# Patient Record
Sex: MALE | URBAN - METROPOLITAN AREA
[De-identification: names, ages, dates, MRNs, and addresses within clinical notes are randomized per-mention and may not be internally consistent; named-entity substitution may affect disease eponyms.]

---

## 2023-10-09 PROBLEM — I25.10 CAD, MULTIPLE VESSEL: Chronic | Status: ACTIVE | Noted: 2023-10-09

## 2023-10-09 PROBLEM — R03.0 ELEVATED BLOOD PRESSURE READING: Chronic | Status: ACTIVE | Noted: 2022-08-24

## 2023-10-09 PROBLEM — M19.032 PRIMARY OSTEOARTHRITIS OF LEFT WRIST: Status: ACTIVE | Noted: 2022-03-04

## 2023-10-09 PROBLEM — S81.809A NON-HEALING WOUND OF LOWER EXTREMITY: Status: ACTIVE | Noted: 2023-07-11

## 2023-10-09 PROBLEM — J30.9 ALLERGIC RHINITIS: Chronic | Status: ACTIVE | Noted: 2020-07-15

## 2023-10-09 PROBLEM — K57.30 DIVERTICULOSIS OF LARGE INTESTINE WITHOUT HEMORRHAGE: Status: ACTIVE | Noted: 2019-06-11

## 2023-10-09 PROBLEM — I10 ESSENTIAL HYPERTENSION: Status: ACTIVE | Noted: 2023-06-30

## 2023-10-09 PROBLEM — Z86.718 HISTORY OF DVT (DEEP VEIN THROMBOSIS): Chronic | Status: ACTIVE | Noted: 2018-09-01

## 2023-10-09 PROBLEM — E03.4 HYPOTHYROIDISM DUE TO ACQUIRED ATROPHY OF THYROID: Status: ACTIVE | Noted: 2018-11-02

## 2023-10-09 PROBLEM — R93.1 CARDIAC LV EJECTION FRACTION OF 35-39%: Status: ACTIVE | Noted: 2023-06-26

## 2023-10-09 PROBLEM — R94.30 CARDIAC LV EJECTION FRACTION OF 35-39%: Status: ACTIVE | Noted: 2023-06-26

## 2023-10-09 PROBLEM — N52.9 ERECTILE DYSFUNCTION: Chronic | Status: ACTIVE | Noted: 2023-10-09

## 2023-10-09 PROBLEM — M35.3 POLYMYALGIA RHEUMATICA (MULTI): Chronic | Status: ACTIVE | Noted: 2017-02-01

## 2023-10-09 PROBLEM — Z96.652 PRESENCE OF LEFT ARTIFICIAL KNEE JOINT: Status: ACTIVE | Noted: 2018-11-01

## 2023-10-09 PROBLEM — R55 NEUROLOGIC CARDIAC SYNCOPE: Status: ACTIVE | Noted: 2023-10-09

## 2023-10-09 PROBLEM — R94.30 CARDIAC LV EJECTION FRACTION >40%: Chronic | Status: ACTIVE | Noted: 2023-10-09

## 2023-10-09 PROBLEM — S88.111A: Status: ACTIVE | Noted: 2023-06-26

## 2023-10-09 PROBLEM — Z86.16 HISTORY OF 2019 NOVEL CORONAVIRUS DISEASE (COVID-19): Chronic | Status: ACTIVE | Noted: 2022-03-27

## 2023-10-09 PROBLEM — I73.9 PAD (PERIPHERAL ARTERY DISEASE) (CMS-HCC): Status: ACTIVE | Noted: 2023-06-06

## 2023-10-09 PROBLEM — M85.821: Chronic | Status: ACTIVE | Noted: 2017-12-02

## 2023-10-09 PROBLEM — I89.0 LYMPHEDEMA: Status: ACTIVE | Noted: 2023-05-08

## 2023-10-09 PROBLEM — I70.229 CRITICAL LOWER LIMB ISCHEMIA (MULTI): Status: ACTIVE | Noted: 2023-06-27

## 2023-10-09 PROBLEM — R42 VERTIGO: Status: ACTIVE | Noted: 2020-09-20

## 2023-10-09 PROBLEM — K64.9 HEMORRHOIDS WITHOUT COMPLICATION: Status: ACTIVE | Noted: 2019-06-11

## 2023-10-09 PROBLEM — R97.20 ELEVATED PROSTATE SPECIFIC ANTIGEN (PSA): Status: ACTIVE | Noted: 2022-07-18

## 2023-10-09 PROBLEM — F43.23 ADJUSTMENT DISORDER WITH MIXED ANXIETY AND DEPRESSED MOOD: Status: ACTIVE | Noted: 2023-07-01

## 2023-10-09 PROBLEM — M85.822: Chronic | Status: ACTIVE | Noted: 2017-12-02

## 2023-10-09 PROBLEM — F33.9 DEPRESSION, RECURRENT (CMS-HCC): Status: ACTIVE | Noted: 2022-11-21

## 2023-10-09 PROBLEM — M25.50 ARTHRALGIA OF MULTIPLE JOINTS: Chronic | Status: ACTIVE | Noted: 2017-01-26

## 2023-10-09 PROBLEM — F41.9 ANXIETY: Chronic | Status: ACTIVE | Noted: 2020-07-15

## 2023-10-09 PROBLEM — K21.9 ESOPHAGEAL REFLUX: Status: ACTIVE | Noted: 2017-09-07

## 2023-10-09 PROBLEM — M19.90 OSTEOARTHRITIS: Chronic | Status: ACTIVE | Noted: 2023-10-09

## 2023-10-09 PROBLEM — I65.23 CAROTID STENOSIS, ASYMPTOMATIC, BILATERAL: Chronic | Status: ACTIVE | Noted: 2020-07-23

## 2023-10-09 PROBLEM — I50.9 HEART FAILURE (MULTI): Chronic | Status: ACTIVE | Noted: 2022-11-21

## 2023-10-09 PROBLEM — I87.2 VENOUS INSUFFICIENCY (CHRONIC) (PERIPHERAL): Chronic | Status: ACTIVE | Noted: 2020-07-13

## 2023-10-09 RX ORDER — METHYLPREDNISOLONE 4 MG/1
6 TABLET ORAL DAILY
COMMUNITY
End: 2024-05-07 | Stop reason: WASHOUT

## 2023-10-09 RX ORDER — ASCORBIC ACID 500 MG
500 TABLET ORAL DAILY
COMMUNITY
End: 2024-02-13 | Stop reason: ALTCHOICE

## 2023-10-09 RX ORDER — SENNOSIDES 8.6 MG/1
TABLET ORAL
COMMUNITY
End: 2024-02-13 | Stop reason: ALTCHOICE

## 2023-10-09 RX ORDER — HYDROGEN PEROXIDE 3 %
20 SOLUTION, NON-ORAL MISCELLANEOUS
COMMUNITY

## 2023-10-09 RX ORDER — ALBUTEROL SULFATE 90 UG/1
2 AEROSOL, METERED RESPIRATORY (INHALATION) EVERY 6 HOURS PRN
COMMUNITY
Start: 2022-12-19

## 2023-10-09 RX ORDER — ASPIRIN 81 MG/1
81 TABLET ORAL DAILY
COMMUNITY
End: 2023-10-11 | Stop reason: ALTCHOICE

## 2023-10-09 RX ORDER — ROSUVASTATIN CALCIUM 10 MG/1
10 TABLET, COATED ORAL DAILY
COMMUNITY

## 2023-10-09 RX ORDER — CHOLECALCIFEROL (VITAMIN D3) 125 MCG
5000 CAPSULE ORAL DAILY
COMMUNITY
Start: 2023-05-22 | End: 2023-11-18

## 2023-10-09 RX ORDER — FERROUS SULFATE 325(65) MG
65 TABLET, DELAYED RELEASE (ENTERIC COATED) ORAL DAILY PRN
COMMUNITY
End: 2024-02-13 | Stop reason: ALTCHOICE

## 2023-10-09 RX ORDER — NITROGLYCERIN 0.4 MG/1
TABLET SUBLINGUAL
COMMUNITY
Start: 2022-11-04 | End: 2023-11-04

## 2023-10-09 RX ORDER — FLUTICASONE PROPIONATE 50 MCG
1 SPRAY, SUSPENSION (ML) NASAL DAILY
COMMUNITY
Start: 2022-11-09 | End: 2023-11-09

## 2023-10-09 RX ORDER — LEVOTHYROXINE SODIUM 75 UG/1
75 TABLET ORAL DAILY
COMMUNITY

## 2023-10-09 RX ORDER — POLYETHYLENE GLYCOL 3350 17 G/17G
17 POWDER, FOR SOLUTION ORAL DAILY PRN
COMMUNITY

## 2023-10-09 RX ORDER — TAMSULOSIN HYDROCHLORIDE 0.4 MG/1
0.4 CAPSULE ORAL DAILY
COMMUNITY

## 2023-10-09 RX ORDER — MULTIVITAMIN
1 TABLET ORAL DAILY
COMMUNITY
End: 2024-02-13 | Stop reason: ALTCHOICE

## 2023-10-09 RX ORDER — MUPIROCIN 20 MG/G
OINTMENT TOPICAL
COMMUNITY
Start: 2023-04-01 | End: 2024-02-16 | Stop reason: ALTCHOICE

## 2023-10-09 RX ORDER — ACETAMINOPHEN 325 MG/1
325 TABLET ORAL 2 TIMES DAILY PRN
COMMUNITY

## 2023-10-09 RX ORDER — CLOPIDOGREL BISULFATE 75 MG/1
1 TABLET ORAL DAILY
COMMUNITY
End: 2024-05-07 | Stop reason: ALTCHOICE

## 2023-10-09 RX ORDER — DORZOLAMIDE HYDROCHLORIDE AND TIMOLOL MALEATE 20; 5 MG/ML; MG/ML
1 SOLUTION/ DROPS OPHTHALMIC 2 TIMES DAILY
COMMUNITY
Start: 2021-06-21

## 2023-10-10 ENCOUNTER — TELEMEDICINE (OUTPATIENT)
Dept: CARDIOLOGY | Facility: CLINIC | Age: 78
End: 2023-10-10
Payer: COMMERCIAL

## 2023-10-10 DIAGNOSIS — R23.9 ALTERATION IN SKIN INTEGRITY RELATED TO SURGICAL INCISION: Primary | ICD-10-CM

## 2023-10-10 DIAGNOSIS — I70.222 CRITICAL LIMB ISCHEMIA OF LEFT LOWER EXTREMITY (MULTI): ICD-10-CM

## 2023-10-10 DIAGNOSIS — I73.9 PAD (PERIPHERAL ARTERY DISEASE) (CMS-HCC): ICD-10-CM

## 2023-10-10 DIAGNOSIS — Z89.432 S/P TRANSMETATARSAL AMPUTATION OF FOOT, LEFT (MULTI): ICD-10-CM

## 2023-10-10 PROCEDURE — 99213 OFFICE O/P EST LOW 20 MIN: CPT | Performed by: INTERNAL MEDICINE

## 2023-10-10 NOTE — PROGRESS NOTES
Medicaid Community Clinical Case Management Discharge Summary    Cedric Jones  Age: 77 y.o.  MRN: [id number]  Date: 10/10/2023  Location of service: {Location of service:61674}    Program Details  No programs to display    Care Plan  ***    Progress Update  ***    Follow-up Recommendations  ***    Patient Medications    Current Outpatient Medications:     acetaminophen (Tylenol) 325 mg tablet, Take 1 tablet (325 mg) by mouth 2 times a day as needed., Disp: , Rfl:     albuterol 90 mcg/actuation inhaler, Inhale 2 puffs every 6 hours if needed for shortness of breath., Disp: , Rfl:     apixaban (Eliquis) 5 mg tablet, TAKE 1 TABLET BY MOUTH EVERY 12 HOURS, Disp: 60 tablet, Rfl: 3    ascorbic acid (Vitamin C) 500 mg tablet, Take 1 tablet (500 mg) by mouth once daily., Disp: , Rfl:     aspirin 81 mg EC tablet, Take 1 tablet (81 mg) by mouth once daily., Disp: , Rfl:     cholecalciferol (Vitamin D-3) 125 MCG (5000 UT) capsule, Take 1 capsule (5,000 Units) by mouth once daily., Disp: , Rfl:     clopidogrel (Plavix) 75 mg tablet, , Disp: , Rfl:     dorzolamide-timoloL (Cosopt) 22.3-6.8 mg/mL ophthalmic solution, Administer into both eyes twice a day., Disp: , Rfl:     esomeprazole (NexIUM) 20 mg DR capsule, Take 1 capsule (20 mg) by mouth once daily in the morning. Take before meals., Disp: , Rfl:     ferrous sulfate 325 (65 Fe) MG EC tablet, Take 65 mg by mouth once daily as needed., Disp: , Rfl:     fluticasone (Flonase) 50 mcg/actuation nasal spray, Administer 1 spray into each nostril once daily., Disp: , Rfl:     levothyroxine (Synthroid, Levoxyl) 75 mcg tablet, Take 1 tablet (75 mcg) by mouth once daily., Disp: , Rfl:     methylPREDNISolone (Medrol) 4 mg tablet, Take 1.5 tablets (6 mg) by mouth once daily., Disp: , Rfl:     metoprolol succinate XL (Toprol-XL) 25 mg 24 hr tablet, TAKE 1/2 TABLET BY MOUTH ONCE DAILY, Disp: 15 tablet, Rfl: 3    multivitamin tablet, Take 1 tablet by mouth once daily., Disp: , Rfl:      mupirocin (Bactroban) 2 % ointment, Apply 1 treatment topically 3 (three) times daily., Disp: , Rfl:     nitroglycerin (Nitrostat) 0.4 mg SL tablet, Place 1 tablet (0.4 mg total) under the tongue daily as needed., Disp: , Rfl:     polyethylene glycol (Glycolax, Miralax) 17 gram/dose powder, Take 17 g by mouth once daily as needed., Disp: , Rfl:     rosuvastatin (Crestor) 10 mg tablet, Take 1 tablet (10 mg) by mouth once daily., Disp: , Rfl:     sennosides (senna) 8.6 mg tablet, Take as directed, Disp: , Rfl:     tamsulosin (Flomax) 0.4 mg 24 hr capsule, Take 1 capsule (0.4 mg) by mouth once daily., Disp: , Rfl:     traMADol (Ultram) 50 mg tablet, TAKE 1 TABLET BY MOUTH EVERY 6 HOURS AS NEEDED FOR MODERATE PAIN (4-6), Disp: 28 tablet, Rfl: 1    traZODone (Desyrel) 100 mg tablet, TAKE 1 TABLET BY MOUTH EVERY NIGHT AT BEDTIME, Disp: 30 tablet, Rfl: 3    vit A/vit C/vit E/zinc/copper (EYE MULTIVITAMIN ORAL), Eye-vites oral, Disp: , Rfl:       SHANTHI Espinosa-CNP

## 2023-10-11 PROBLEM — I70.222 CRITICAL LIMB ISCHEMIA OF LEFT LOWER EXTREMITY (MULTI): Status: ACTIVE | Noted: 2023-06-27

## 2023-10-12 NOTE — PROGRESS NOTES
The patient was contacted today via virtual visit with video and audio.     Chief complaint   1 month follow-up for left TMA incision site assessment.  Patient 3 months post peripheral angioplasty and 2 months post left TMA.     History of Present Illness  Cedric Jones is a 77 y.o. year old male from [place], [place] with h/o GERD, polymyalgia rheumatica on chronic steroids, CAD s/p PCI (last one in June 2022, unclear vessel), HTN, HLD, CHRIS, BPH, DVT, bilateral knee and hip replacements, ischemic cardiomyopathy(EF 15 -20% 7/2023), left ventricle thrombus and severe PAD, who developed bilateral ulcers in both foot after having biking adventure in [place]. 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 Pt was self referred to Dr. Duarte for left lower extremity limb salvage in July 2023. Pre procedure NICO/TBI from OSH left 0.31/0. On 7/17/23, status post PTA-DCB to left SFA-popliteal and PTA-stenting to left TPT/PT by Dr. Duarte for LLE CLTI 6 . Post-procedure 7/18 NICO/TBI 0.40/0, the waveform showed improved flow except in the digits. On 8/11/23, Pt had a TMA by his local podiatrist Dr. Enrique Nunez.   Patient being seen pt today with his wife Natalie crawley for left TMA site assessment 1 month post amputation.  Patient reports his podiatrist Dr. Nunez removed the sutures from the TMA site on 9/26/2023 and incision is well approximated with 1 small area remaining.  Patient reported Dr. Nunez is encouraged with the result the wound progression and will see him again on October 23, 2023 to get him fitted with shoe insert.  Patient now allowed limited weightbearing to pivot using a walker.  He also reports he has been compliant with leg elevation and now edema has resolved. Pt denies any leg or foot pain. Pt was instructed to stop taking ASA last virtual visit but continues to take it. Otherwise, complaint with all other  medications. Patient denies chest pain, shortness of breath, palpitation, fever or chills, dark stool or blood in the urine.     Past Medical History  Past Medical History:   Diagnosis Date    Atrial fibrillation (CMS/[facility])     CHF (congestive heart failure) (CMS/[facility])     Congenital heart disease     Critical limb ischemia of left lower extremity (CMS/[facility])     DVT (deep venous thrombosis) (CMS/[facility])     Hyperlipidemia     Hypertension     Ischemic dilated cardiomyopathy due to coronary artery disease     PAD (peripheral artery disease) (CMS/[facility])     Polymyalgia rheumatica (CMS/[facility])     on chronic steroids    Right atrial thrombus     Sleep apnea        Past Surgical History  Past Surgical History:   Procedure Laterality Date    CAROTID STENT      June 2022    CT ABDOMEN PELVIS ANGIOGRAM W AND/OR WO IV CONTRAST  09/19/2020    CT ABDOMEN PELVIS ANGIOGRAM W AND/OR WO IV CONTRAST 9/19/2020    FOOT AMPUTATION THROUGH METATARSAL Left 08/11/2023    By Dr. Enrique Nunez at [facility] in [place]    LEG AMPUTATION THROUGH LOWER TIBIA AND FIBULA Right 06/13/2023    TOTAL HIP ARTHROPLASTY Bilateral     TOTAL KNEE ARTHROPLASTY Bilateral        Social History  Social History     Tobacco Use    Smoking status: Never    Smokeless tobacco: Never   Substance Use Topics    Alcohol use: Yes     Alcohol/week: 2.0 standard drinks of alcohol     Types: 2 Glasses of wine per week    Drug use: Never       Family History     Family History   Family history unknown: Yes       Review of Systems  As per HPI, all other systems reviewed and negative.    Allergies:  Allergies   Allergen Reactions    Adhesive Unknown     Tears skin off    Aztreonam Unknown     Put on this after Vancomycin reaction; symptoms then got worse, but not sure what was making it worse    Cefepime Hives     Rash/hives; on at same time as Vancomycin      3/4/22 negative cefazolin test    Enoxaparin Hives    Heparin Other     Historical Data review with patient    Vancomycin  Analogues Hives and Swelling     Rash, hives        Outpatient Medications:  Current Outpatient Medications   Medication Instructions    acetaminophen (TYLENOL) 325 mg, oral, 2 times daily PRN    albuterol 90 mcg/actuation inhaler 2 puffs, inhalation, Every 6 hours PRN    apixaban (Eliquis) 5 mg tablet TAKE 1 TABLET BY MOUTH EVERY 12 HOURS    ascorbic acid (VITAMIN C) 500 mg, oral, Daily    cholecalciferol (VITAMIN D-3) 5,000 Units, oral, Daily    clopidogrel (Plavix) 75 mg tablet No dose, route, or frequency recorded.    dorzolamide-timoloL (Cosopt) 22.3-6.8 mg/mL ophthalmic solution Both Eyes, 2 times daily    esomeprazole (NEXIUM) 20 mg, oral, Daily before breakfast    ferrous sulfate 65 mg, oral, Daily PRN    fluticasone (Flonase) 50 mcg/actuation nasal spray 1 spray, Each Nostril, Daily    levothyroxine (SYNTHROID, LEVOXYL) 75 mcg, oral, Daily    methylPREDNISolone (MEDROL) 6 mg, oral, Daily    metoprolol succinate XL (Toprol-XL) 25 mg 24 hr tablet TAKE 1/2 TABLET BY MOUTH ONCE DAILY    multivitamin tablet 1 tablet, oral, Daily    mupirocin (Bactroban) 2 % ointment Apply 1 treatment topically 3 (three) times daily.    nitroglycerin (Nitrostat) 0.4 mg SL tablet Place 1 tablet (0.4 mg total) under the tongue daily as needed.    polyethylene glycol (GLYCOLAX, MIRALAX) 17 g, oral, Daily PRN    rosuvastatin (CRESTOR) 10 mg, oral, Daily    sennosides (senna) 8.6 mg tablet Take as directed    tamsulosin (FLOMAX) 0.4 mg, oral, Daily    traMADol (Ultram) 50 mg tablet TAKE 1 TABLET BY MOUTH EVERY 6 HOURS AS NEEDED FOR MODERATE PAIN (4-6)    traZODone (Desyrel) 100 mg tablet TAKE 1 TABLET BY MOUTH EVERY NIGHT AT BEDTIME    vit A/vit C/vit E/zinc/copper (EYE MULTIVITAMIN ORAL) Eye-vites oral         Vitals:  There were no vitals filed for this visit.    Physical Exam:  AOx3, NAD  Appropriate mood and behavior  Breathing unlabored, speaking in full sentences  Skin: No discolorations,  Extr: JOSÉ, Right BKA, left TMA, no  edema     Reviewed Study(s):    Cardiac Studies  Echo: 7/18/23  CONCLUSIONS:  1. Left ventricular systolic function is severely decreased with a 15-20% estimated ejection fraction.  2. Multiple segmental abnormalities exist. See findings.  3. Global hypokinesis is noted along with inferior, septal and apical akinesis. There is a moderate sized fixed filling defect within the LV apex measuring 1.6 cm x 1.4 cm consistent with LV apical thrombus.  4. Spectral Doppler shows a pseudonormal pattern of left ventricular diastolic filling.  5. There is severe eccentric left ventricular hypertrophy.  6. Left ventricular cavity size is severely dilated.  7. The aortic root ( 4.3 cm) and mid ascending aorta ( 4.3 cm) are moderately dilated.  8. Primary service notified of results at the time of reporting.  9. No prior echocardiogram available for comparison    Angiogram: 7/17/23 by Dr. Duarte and Dr. Chavis    1. LLE CLTI 5 status post PTA-DCB to left SFA-popliteal and PTA-stenting to left TPT/PT.  Vascular Studies: Postprocedure NICO 7/18/23  CONCLUSIONS:  Right Lower PVR: Triphasic flow is noted in the right common femoral artery. BKA noted.  Left Lower PVR: Evidence of moderate arterial occlusive disease in the left lower extremity at rest. Left pressures of >220 mmHg suggest no compressibility of vessels and may make absolute Segmental Limb Pressures (SLP) unreliable. Decreased digital perfusion noted. Biphasic flow is noted in the left common femoral artery, left posterior tibial artery and left dorsalis pedis artery. Part of limb flow mapping.  Imaging & Doppler Findings:  LEFT Lower PVR                Pressures Ratios  Left Posterior Tibial (Ankle) 255 mmHg  2.34  Left Dorsalis Pedis (Ankle)   44 mmHg   0.40  Left Digit (Great Toe)        0 mmHg    0.00        Assessment/Plan   A 77-year-old male from [place] with a history of CAD, hypertension, heart failure, LV thrombus and severe PAD complicated by gangrene now  status post right BKA 6/2023 and left TMA 8/2023.  On virtual video assessment left TMA site healing nicely, incision appears mostly well approximated, no redness or drainage, no discoloration or ischemic changes and no obvious edema noted.  Patient wife stated they are applying Betadine as instructed by his podiatrist and following up with Dr. Nunez.  Due to trauma    Plan  -No additional endovascular intervention is needed at this time as patient's wound continues to heal without complications  -Patient to continue with current medications Plavix, Eliquis and rosuvastatin.  Patient to stop taking aspirin.  - Continue aggressive risk factor modifications diet, exercise (walking as allowed) and foot protection (proper footwear with orthotic insert)  -Continue incision care as ordered and follow-up with podiatrist Dr. Nunez as scheduled  -3 months virtual or in person follow-up    Jeremy Duarte DO

## 2024-01-30 ENCOUNTER — APPOINTMENT (OUTPATIENT)
Dept: CARDIOLOGY | Facility: CLINIC | Age: 79
End: 2024-01-30
Payer: MEDICARE

## 2024-02-07 NOTE — PROGRESS NOTES
Virtual or Telephone Consent    An interactive audio and video telecommunication system which permits real time communications between the patient (at the originating site) and provider (at the distant site) was utilized to provide this telehealth service.   Verbal consent was requested and obtained from Cedric Jones on this date, 02/16/24 for a telehealth visit.      Chief complaint   3 month follow-up for left TMA incision site assessment.       History of Present Illness  Cedric Jones is a 78 y.o. year old male from [place], [place] with h/o GERD, polymyalgia rheumatica on chronic steroids, CAD s/p PCI (last one in June 2022, unclear vessel), HTN, HLD, CHRIS, BPH, DVT, bilateral knee and hip replacements, ischemic cardiomyopathy(EF 15 -20% 7/2023), left ventricle thrombus and severe PAD, who developed bilateral ulcers in both feet after having biking adventure in [place]. Course was complicated by non-healing wound on right leg, s/p BKA on 6/13/2023, he then developed necrotic ulcers in left toes. Pt was self referred to us for left lower extremity limb salvage in July 2023.   Pre procedure NICO/TBI from OSH Lt 0.31/0. On 7/17/23, pt underwent PTA-DCB of Lt SFA-popliteal and PTA-stenting to left TPT/PT by Dr. Duarte for LLE CLTI 6.   Post-procedure 7/18 NICO/TBI 0.40/0, the waveform showed improved flow except in the digits.   On 8/11/23, Pt had a TMA by his local podiatrist Dr. Enrique Nunez.     Patient is being seen today with his wife Natalie crawley for left TMA site assessment.    The wound is getting better, almost healed, except for a small site on the medial side of the incision, no drainage, no signs of infection (see pictures taken through the monitor, in  the media tab).  Pt and his podiatrist are pleased with the healing. Pt denies any foot pain.   He is complaint with his medications regimen, and foot protection. They are planning on 6 week vacation in [place], discussed the importance of  avoiding swimming in the ocean.    Past Medical History  Past Medical History:   Diagnosis Date    Atrial fibrillation (CMS/[facility])     CHF (congestive heart failure) (CMS/[facility])     Congenital heart disease     Critical limb ischemia of left lower extremity (CMS/[facility])     DVT (deep venous thrombosis) (CMS/[facility])     Hyperlipidemia     Hypertension     Ischemic dilated cardiomyopathy due to coronary artery disease     PAD (peripheral artery disease) (CMS/[facility])     Polymyalgia rheumatica (CMS/[facility])     on chronic steroids    Right atrial thrombus     S/P peripheral artery angioplasty with stent placement 07/17/2023    PTA-DCB to left SFA-popliteal and PTA-stenting to left TPT/PT    Sleep apnea        Past Surgical History  Past Surgical History:   Procedure Laterality Date    BALLOON ANGIOPLASTY, ARTERY Left     CAROTID STENT      June 2022    CT ABDOMEN PELVIS ANGIOGRAM W AND/OR WO IV CONTRAST  09/19/2020    CT ABDOMEN PELVIS ANGIOGRAM W AND/OR WO IV CONTRAST 9/19/2020    FOOT AMPUTATION THROUGH METATARSAL Left 08/11/2023    By Dr. Enrique Nunez at [facility] in [place]    LEG AMPUTATION THROUGH LOWER TIBIA AND FIBULA Right 06/13/2023    TOTAL HIP ARTHROPLASTY Bilateral     TOTAL KNEE ARTHROPLASTY Bilateral        Social History  Social History     Tobacco Use    Smoking status: Never    Smokeless tobacco: Never   Substance Use Topics    Alcohol use: Yes     Alcohol/week: 2.0 standard drinks of alcohol     Types: 2 Glasses of wine per week    Drug use: Never       Family History     Family History   Family history unknown: Yes       Review of Systems  As per HPI, all other systems reviewed and negative.    Allergies:  Allergies   Allergen Reactions    Adhesive Unknown     Tears skin off    Aztreonam Unknown     Put on this after Vancomycin reaction; symptoms then got worse, but not sure what was making it worse    Cefepime Hives     Rash/hives; on at same time as Vancomycin      3/4/22 negative cefazolin test    Enoxaparin  Hives    Heparin Other     Historical Data review with patient    Vancomycin Analogues Hives and Swelling     Rash, hives        Outpatient Medications:  Current Outpatient Medications   Medication Instructions    acetaminophen (TYLENOL) 325 mg, oral, 2 times daily PRN    albuterol 90 mcg/actuation inhaler 2 puffs, inhalation, Every 6 hours PRN    apixaban (Eliquis) 5 mg tablet TAKE 1 TABLET BY MOUTH EVERY 12 HOURS    brimonidine (AlphaGAN) 0.2 % ophthalmic solution 1 drop, 2 times daily, Left eye bid     clopidogrel (Plavix) 75 mg tablet No dose, route, or frequency recorded.    dorzolamide-timoloL (Cosopt) 22.3-6.8 mg/mL ophthalmic solution Both Eyes, 2 times daily    esomeprazole (NEXIUM) 20 mg, oral, Daily before breakfast    finasteride (PROSCAR) 5 mg, oral, Daily, Do not crush, chew, or split.    fluticasone (Flonase) 50 mcg/actuation nasal spray 1 spray, Each Nostril, Daily, Shake gently. Before first use, prime pump. After use, clean tip and replace cap.    levothyroxine (SYNTHROID, LEVOXYL) 75 mcg, oral, Daily    methylPREDNISolone (MEDROL) 6 mg, oral, Daily    metoprolol succinate XL (Toprol-XL) 25 mg 24 hr tablet TAKE 1/2 TABLET BY MOUTH ONCE DAILY    polyethylene glycol (GLYCOLAX, MIRALAX) 17 g, oral, Daily PRN    rosuvastatin (CRESTOR) 10 mg, oral, Daily    sacubitriL-valsartan (Entresto) 24-26 mg tablet 1 tablet, oral, 2 times daily, 1/2 tab bid    tamsulosin (FLOMAX) 0.4 mg, oral, Daily    vit A/vit C/vit E/zinc/copper (EYE MULTIVITAMIN ORAL) Eye-vites oral         Vitals:  No VS due to virtual visit    Physical Exam:  AOx3, NAD  Appropriate mood and behavior  Breathing unlabored, speaking in full sentences  Skin: No discolorations,  Extr: JOSÉ, Right BKA, left TMA, no edema, no open sores except for small residual noninfected dehiscence on the medial site of the incision      Reviewed Study(s):    Cardiac Studies  Echo: 7/18/23  CONCLUSIONS:  1. Left ventricular systolic function is severely decreased  with a 15-20% estimated ejection fraction.  2. Multiple segmental abnormalities exist. See findings.  3. Global hypokinesis is noted along with inferior, septal and apical akinesis. There is a moderate sized fixed filling defect within the LV apex measuring 1.6 cm x 1.4 cm consistent with LV apical thrombus.  4. Spectral Doppler shows a pseudonormal pattern of left ventricular diastolic filling.  5. There is severe eccentric left ventricular hypertrophy.  6. Left ventricular cavity size is severely dilated.  7. The aortic root ( 4.3 cm) and mid ascending aorta ( 4.3 cm) are moderately dilated.  8. Primary service notified of results at the time of reporting.  9. No prior echocardiogram available for comparison    Angiogram: 7/17/23 by Dr. Duarte and Dr. Chavis    1. LLE CLTI 5 status post PTA-DCB to left SFA-popliteal and PTA-stenting to left TPT/PT.  Vascular Studies: Postprocedure NICO 7/18/23  CONCLUSIONS:  Right Lower PVR: Triphasic flow is noted in the right common femoral artery. BKA noted.  Left Lower PVR: Evidence of moderate arterial occlusive disease in the left lower extremity at rest. Left pressures of >220 mmHg suggest no compressibility of vessels and may make absolute Segmental Limb Pressures (SLP) unreliable. Decreased digital perfusion noted. Biphasic flow is noted in the left common femoral artery, left posterior tibial artery and left dorsalis pedis artery. Part of limb flow mapping.  Imaging & Doppler Findings:  LEFT Lower PVR                Pressures Ratios  Left Posterior Tibial (Ankle) 255 mmHg  2.34  Left Dorsalis Pedis (Ankle)   44 mmHg   0.40  Left Digit (Great Toe)        0 mmHg    0.00        Assessment/Plan   Cedric Jones is a 78 y.o. year old male from [place], [place] with h/o GERD, polymyalgia rheumatica on chronic steroids, CAD s/p PCI (last one in June 2022, unclear vessel), HTN, HLD, CHRIS, BPH, DVT, bilateral knee and hip replacements, ischemic cardiomyopathy(EF 15 -20%  7/2023), left ventricle thrombus and severe PAD, who developed bilateral ulcers in both feet after having biking adventure in [place]. Course was complicated by non-healing wound on right leg, s/p BKA on 6/13/2023, he then developed necrotic ulcers in left toes. Pt was self referred to us for left lower extremity limb salvage in July 2023.   Pre procedure NICO/TBI from OSH Lt 0.31/0. On 7/17/23, pt underwent PTA-DCB of Lt SFA-popliteal and PTA-stenting to left TPT/PT by Dr. Duarte for LLE CLTI 6.   Post-procedure 7/18 NICO/TBI 0.40/0, the waveform showed improved flow except in the digits.   On 8/11/23, Pt had a TMA by his local podiatrist Dr. Enrique Nunez.   Patient is being seen today with his wife Natalie crawley for left TMA site assessment.    The wound is getting better, almost healed, except for a small site on the medial side of the incision, no drainage, no signs of infection (see pictures taken through the monitor, in  the media tab).  Pt and his podiatrist are pleased with the healing. Pt denies any foot pain.   He is complaint with his medications regimen, and foot protection. They are planning on 6 week vacation in [place], discussed the importance of avoiding swimming in the ocean.  Patient to continue with current medications Plavix, Eliquis and rosuvastatin.    Continue with exercise (walking as allowed) and foot protection (proper footwear with orthotic insert)  Follow up in May in person, when back from [place], with NICO/TBI and arterial duplex US, or sooner if needed    Jeremy Duarte,

## 2024-02-13 ENCOUNTER — TELEMEDICINE (OUTPATIENT)
Dept: CARDIOLOGY | Facility: CLINIC | Age: 79
End: 2024-02-13
Payer: COMMERCIAL

## 2024-02-13 DIAGNOSIS — I73.9 PAD (PERIPHERAL ARTERY DISEASE) (CMS-HCC): Primary | ICD-10-CM

## 2024-02-13 DIAGNOSIS — S88.111A: ICD-10-CM

## 2024-02-13 DIAGNOSIS — Z95.820 S/P PERIPHERAL ARTERY ANGIOPLASTY WITH STENT PLACEMENT: ICD-10-CM

## 2024-02-13 DIAGNOSIS — Z79.01 ANTICOAGULANT LONG-TERM USE: ICD-10-CM

## 2024-02-13 DIAGNOSIS — Z89.432 S/P TRANSMETATARSAL AMPUTATION OF FOOT, LEFT (MULTI): ICD-10-CM

## 2024-02-13 PROCEDURE — 1159F MED LIST DOCD IN RCRD: CPT | Performed by: INTERNAL MEDICINE

## 2024-02-13 PROCEDURE — 1036F TOBACCO NON-USER: CPT | Performed by: INTERNAL MEDICINE

## 2024-02-13 PROCEDURE — 99214 OFFICE O/P EST MOD 30 MIN: CPT | Performed by: INTERNAL MEDICINE

## 2024-02-13 RX ORDER — SACUBITRIL AND VALSARTAN 24; 26 MG/1; MG/1
0.5 TABLET, FILM COATED ORAL 2 TIMES DAILY
COMMUNITY

## 2024-02-13 RX ORDER — FLUTICASONE PROPIONATE 50 MCG
1 SPRAY, SUSPENSION (ML) NASAL DAILY
COMMUNITY

## 2024-02-13 RX ORDER — FINASTERIDE 5 MG/1
5 TABLET, FILM COATED ORAL DAILY
COMMUNITY

## 2024-02-13 RX ORDER — BRIMONIDINE TARTRATE 2 MG/ML
1 SOLUTION/ DROPS OPHTHALMIC 2 TIMES DAILY
COMMUNITY

## 2024-05-03 NOTE — PROGRESS NOTES
Chief complaint   PAD Follow-up      History of Present Illness  Cedric Jones is a 78 y.o. year old male from [place], [place] with h/o GERD, polymyalgia rheumatica on chronic steroids, CAD s/p PCI (last one in June 2022, unclear vessel), HTN, HLD, CHRIS, BPH, DVT, bilateral knee and hip replacements, ischemic cardiomyopathy(EF 15 -20% 7/2023), left ventricle thrombus and severe PAD, who developed bilateral ulcers in both feet after having biking adventure in [place]. 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 Pt was self referred to us for left lower extremity limb salvage in July 2023.   On 7/17/23, pt underwent PTA-DCB of Lt SFA-popliteal and PTA-stenting to left TPT/PT by Dr. Duarte for LLE CLTI 6.   Post-procedure 7/18 NICO/TBI 0.40/0, the waveform showed improved flow except in the digits.   On 8/11/23, Pt had a Lt TMA by his local podiatrist Dr. Enrique Nunez.   Pt was last seen virtually 2/2024. At that time TMA site has almost healed and pt and podiatrist were pleased with the results.   Today he states his TMA is completely healed and denies LE pain at rest or with activity. Denies new Lt foot wounds and was recently discharged from wound care given healed TMA site. Continues to takes plavix and eliquis and denies easy bleeding or bruising. However, he was told by his optometrist that he was having some bleeding in his eye and was recommended to follow up on duration of anticoagulation.     Past Medical History  Past Medical History:   Diagnosis Date    Atrial fibrillation (Multi)     CHF (congestive heart failure) (Multi)     Congenital heart disease ([facility])     Critical limb ischemia of left lower extremity (Multi)     DVT (deep venous thrombosis) (Multi)     Hyperlipidemia     Hypertension     Ischemic dilated cardiomyopathy due to coronary artery disease     PAD (peripheral artery disease) (CMS-HCC)      Polymyalgia rheumatica (Multi)     on chronic steroids    Right atrial thrombus     S/P peripheral artery angioplasty with stent placement 07/17/2023    PTA-DCB to left SFA-popliteal and PTA-stenting to left TPT/PT    Sleep apnea        Past Surgical History  Past Surgical History:   Procedure Laterality Date    BALLOON ANGIOPLASTY, ARTERY Left     CAROTID STENT      June 2022    CT ABDOMEN PELVIS ANGIOGRAM W AND/OR WO IV CONTRAST  09/19/2020    CT ABDOMEN PELVIS ANGIOGRAM W AND/OR WO IV CONTRAST 9/19/2020    FOOT AMPUTATION THROUGH METATARSAL Left 08/11/2023    By Dr. Enrique Nunez at [facility] in [place]    LEG AMPUTATION THROUGH LOWER TIBIA AND FIBULA Right 06/13/2023    TOTAL HIP ARTHROPLASTY Bilateral     TOTAL KNEE ARTHROPLASTY Bilateral        Social History  Social History     Tobacco Use    Smoking status: Never    Smokeless tobacco: Never   Substance Use Topics    Alcohol use: Yes     Alcohol/week: 2.0 standard drinks of alcohol     Types: 2 Glasses of wine per week    Drug use: Never       Family History     Family History   Family history unknown: Yes       Review of Systems  As per HPI, all other systems reviewed and negative.    Allergies:  Allergies   Allergen Reactions    Adhesive Unknown     Tears skin off    Aztreonam Unknown     Put on this after Vancomycin reaction; symptoms then got worse, but not sure what was making it worse    Cefepime Hives     Rash/hives; on at same time as Vancomycin      3/4/22 negative cefazolin test    Enoxaparin Hives    Heparin Other     Historical Data review with patient    Vancomycin Analogues Hives and Swelling     Rash, hives        Outpatient Medications:  Current Outpatient Medications   Medication Instructions    acetaminophen (TYLENOL) 325 mg, oral, 2 times daily PRN    albuterol 90 mcg/actuation inhaler 2 puffs, inhalation, Every 6 hours PRN    apixaban (Eliquis) 5 mg tablet TAKE 1 TABLET BY MOUTH EVERY 12 HOURS    brimonidine (AlphaGAN) 0.2 % ophthalmic  "solution 1 drop, 2 times daily, Left eye bid     clopidogrel (Plavix) 75 mg tablet No dose, route, or frequency recorded.    dorzolamide-timoloL (Cosopt) 22.3-6.8 mg/mL ophthalmic solution Both Eyes, 2 times daily    esomeprazole (NEXIUM) 20 mg, oral, Daily before breakfast    finasteride (PROSCAR) 5 mg, oral, Daily, Do not crush, chew, or split.    fluticasone (Flonase) 50 mcg/actuation nasal spray 1 spray, Each Nostril, Daily, Shake gently. Before first use, prime pump. After use, clean tip and replace cap.    levothyroxine (SYNTHROID, LEVOXYL) 75 mcg, oral, Daily    methylPREDNISolone (MEDROL) 6 mg, oral, Daily    metoprolol succinate XL (Toprol-XL) 25 mg 24 hr tablet TAKE 1/2 TABLET BY MOUTH ONCE DAILY    polyethylene glycol (GLYCOLAX, MIRALAX) 17 g, oral, Daily PRN    rosuvastatin (CRESTOR) 10 mg, oral, Daily    sacubitriL-valsartan (Entresto) 24-26 mg tablet 1 tablet, oral, 2 times daily, 1/2 tab bid    tamsulosin (FLOMAX) 0.4 mg, oral, Daily    vit A/vit C/vit E/zinc/copper (EYE MULTIVITAMIN ORAL) Eye-vites oral         Vitals:      5/7/2024     2:38 PM   Vitals   Systolic 123   Diastolic 60   Heart Rate 77   Height (in) 1.854 m (6' 1\")   Weight (lb) 179   BMI 23.62 kg/m2   BSA (m2) 2.05 m2   Visit Report Report        Physical Exam:  General: NAD, appears comfortable, Aox3  Heart: RRR, no m/r/g  Lungs: CTAB, no rales/ronchi/wheezing   Extr: Right BKA, left TMA, no edema, no open sores/ulcers, warm, +distal pulses      Reviewed Study(s):  PRELIMINARY CONCLUSIONS:  Left Lower Arterial:  Left Lower Arterial:  Left LimFlow Table:  Inflow artery VF 67.1 ml/min Diam 5.12 mm 63.0/3.7 cm/s  Crossing stent/anast VF 55 ml/min Diam 5.16 mm 54.3/3.7 cm/s  Mid stent -VF 42 ml/min Diam 2.98 mm 77/9 cm/s  Distal edge of stent graft VF 15.5 ml/min Diam 2.26 mm 73/7 cm/s  Outflow arterialized vein VF 9.7 ml/min Diam 1.42 mm 34/7 cm/s  Outflow vein 2cm below VF 10 ml/min Diam .142 mm 34/7 cm/s  LPV prox foot VF 26 ml/min Diam " 2.80 mm 18/6 cm/s.           Imaging & Doppler Findings:     Right                     Left   PSV                      PSV               CFA        56 cm/s        Profunda Proximal 44 cm/s          SFA Proximal    44 cm/s             SFA Mid      105 cm/s           SFA Distal     99 cm/s            Popliteal     54 cm/s           RASHEEDA Distal     20 cm/s         Peroneal Distal   9 cm/s           PTA Distal     58 cm/s    Cardiac Studies  Echo: 7/18/23  CONCLUSIONS:  1. Left ventricular systolic function is severely decreased with a 15-20% estimated ejection fraction.  2. Multiple segmental abnormalities exist. See findings.  3. Global hypokinesis is noted along with inferior, septal and apical akinesis. There is a moderate sized fixed filling defect within the LV apex measuring 1.6 cm x 1.4 cm consistent with LV apical thrombus.  4. Spectral Doppler shows a pseudonormal pattern of left ventricular diastolic filling.  5. There is severe eccentric left ventricular hypertrophy.  6. Left ventricular cavity size is severely dilated.  7. The aortic root ( 4.3 cm) and mid ascending aorta ( 4.3 cm) are moderately dilated.  8. Primary service notified of results at the time of reporting.  9. No prior echocardiogram available for comparison    Angiogram: 7/17/23 by Dr. Duarte and Dr. Chavis    1. LLE CLTI 5 status post PTA-DCB to left SFA-popliteal and PTA-stenting to left TPT/PT.  Vascular Studies: Postprocedure NICO 7/18/23  CONCLUSIONS:  Right Lower PVR: Triphasic flow is noted in the right common femoral artery. BKA noted.  Left Lower PVR: Evidence of moderate arterial occlusive disease in the left lower extremity at rest. Left pressures of >220 mmHg suggest no compressibility of vessels and may make absolute Segmental Limb Pressures (SLP) unreliable. Decreased digital perfusion noted. Biphasic flow is noted in the left common femoral artery, left posterior tibial artery and left dorsalis pedis artery. Part of limb flow  mapping.  Imaging & Doppler Findings:  LEFT Lower PVR                Pressures Ratios  Left Posterior Tibial (Ankle) 255 mmHg  2.34  Left Dorsalis Pedis (Ankle)   44 mmHg   0.40  Left Digit (Great Toe)        0 mmHg    0.00     Vascular Duplex 5/7/24:  PRELIMINARY CONCLUSIONS:  Left Lower Arterial: LimFlow Post Op  Left Lower Arterial:  Left LimFlow Table:  Inflow artery VF 67.1 ml/min Diam 5.12 mm 63.0/3.7 cm/s  Crossing stent/anast VF 55 ml/min Diam 5.16 mm 54.3/3.7 cm/s  Mid stent -VF 42 ml/min Diam 2.98 mm 77/9 cm/s  Distal edge of stent graft VF 15.5 ml/min Diam 2.26 mm 73/7 cm/s  Outflow arterialized vein VF 9.7 ml/min Diam 1.42 mm 34/7 cm/s  Outflow vein 2cm below VF 10 ml/min Diam .142 mm 34/7 cm/s  LPV prox foot VF 26 ml/min Diam 2.80 mm 18/6 cm/s.           Imaging & Doppler Findings:     Right                     Left   PSV                      PSV               CFA        56 cm/s        Profunda Proximal 44 cm/s          SFA Proximal    44 cm/s             SFA Mid      105 cm/s           SFA Distal     99 cm/s            Popliteal     54 cm/s           RASHEEDA Distal     20 cm/s         Peroneal Distal   9 cm/s           PTA Distal     58 cm/s      Assessment/Plan   Cedric Jones is a 78 y.o. year old male from [place], [place] with h/o GERD, polymyalgia rheumatica on chronic steroids, CAD s/p PCI (last one in June 2022, unclear vessel), HTN, HLD, CHRIS, BPH, DVT, bilateral knee and hip replacements, ischemic cardiomyopathy(EF 15 -20% 7/2023), left ventricle thrombus and severe PAD, who developed bilateral ulcers in both feet after having biking adventure in [place]. 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 Pt was self referred to us for left lower extremity limb salvage in July 2023.   On 7/17/23, pt underwent PTA-DCB of Lt SFA-popliteal and PTA-stenting to left TPT/PT by Dr. Duarte for LLE CLTI 6.   Post-procedure  7/18 NICO/TBI 0.40/0, the waveform showed improved flow except in the digits.   On 8/11/23, Pt had a Lt TMA by his local podiatrist Dr. Enrique Nunez.   Pt was last seen virtually 2/2024. At that time TMA site has almost healed and pt and podiatrist were pleased with the results.   Today he states his TMA is completely healed and denies LE pain at rest or with activity. Denies new Lt foot wounds and was recently discharged from wound care given healed TMA site. Continues to takes plavix and eliquis and denies easy bleeding or bruising. However, he was told by his optometrist that he was having some bleeding in his eye and was recommended to follow up on duration of anticoagulation.     Plan:   -Stop plavix (given c/f bleeding in the eye by optometrist)  -Start ASA 81 daily  -Continue statin   -Follow up in 1 year with repeat LLE vascular US with NICO     Jeremy Duarte DO

## 2024-05-07 ENCOUNTER — APPOINTMENT (OUTPATIENT)
Dept: RADIOLOGY | Facility: HOSPITAL | Age: 79
End: 2024-05-07
Payer: COMMERCIAL

## 2024-05-07 ENCOUNTER — HOSPITAL ENCOUNTER (OUTPATIENT)
Dept: VASCULAR MEDICINE | Facility: HOSPITAL | Age: 79
Discharge: HOME | End: 2024-05-07
Payer: COMMERCIAL

## 2024-05-07 ENCOUNTER — APPOINTMENT (OUTPATIENT)
Dept: VASCULAR MEDICINE | Facility: HOSPITAL | Age: 79
End: 2024-05-07
Payer: COMMERCIAL

## 2024-05-07 ENCOUNTER — OFFICE VISIT (OUTPATIENT)
Dept: CARDIOLOGY | Facility: CLINIC | Age: 79
End: 2024-05-07
Payer: MEDICARE

## 2024-05-07 VITALS
BODY MASS INDEX: 23.72 KG/M2 | HEIGHT: 73 IN | WEIGHT: 179 LBS | HEART RATE: 77 BPM | DIASTOLIC BLOOD PRESSURE: 60 MMHG | SYSTOLIC BLOOD PRESSURE: 123 MMHG

## 2024-05-07 DIAGNOSIS — I73.9 PAD (PERIPHERAL ARTERY DISEASE) (CMS-HCC): ICD-10-CM

## 2024-05-07 DIAGNOSIS — I15.0 RENOVASCULAR HYPERTENSION: ICD-10-CM

## 2024-05-07 DIAGNOSIS — I73.9 PAD (PERIPHERAL ARTERY DISEASE) (CMS-HCC): Primary | ICD-10-CM

## 2024-05-07 PROBLEM — R94.30 CARDIAC LV EJECTION FRACTION 21-30%: Status: ACTIVE | Noted: 2023-06-26

## 2024-05-07 PROBLEM — R31.0 GROSS HEMATURIA: Status: ACTIVE | Noted: 2023-10-19

## 2024-05-07 PROBLEM — N20.1 RIGHT DISTAL URETERAL CALCULUS: Status: ACTIVE | Noted: 2023-10-19

## 2024-05-07 PROBLEM — I50.40: Chronic | Status: ACTIVE | Noted: 2023-10-30

## 2024-05-07 PROBLEM — Z89.432: Status: ACTIVE | Noted: 2023-08-30

## 2024-05-07 PROBLEM — M06.9 RHEUMATOID ARTHRITIS (MULTI): Status: ACTIVE | Noted: 2024-05-07

## 2024-05-07 PROBLEM — Z86.73 PERSONAL HISTORY OF TRANSIENT ISCHEMIC ATTACK (TIA), AND CEREBRAL INFARCTION WITHOUT RESIDUAL DEFICITS: Status: ACTIVE | Noted: 2018-07-11

## 2024-05-07 PROBLEM — M62.81 MUSCLE WEAKNESS (GENERALIZED): Status: ACTIVE | Noted: 2018-07-11

## 2024-05-07 PROBLEM — R93.89 ABNORMAL CHEST CT: Status: ACTIVE | Noted: 2023-12-19

## 2024-05-07 PROBLEM — I51.3 LV (LEFT VENTRICULAR) MURAL THROMBUS: Status: ACTIVE | Noted: 2022-07-06

## 2024-05-07 PROBLEM — L03.90 CELLULITIS: Status: ACTIVE | Noted: 2023-12-16

## 2024-05-07 PROBLEM — T84.54XD INFECTION AND INFLAMMATORY REACTION DUE TO INTERNAL LEFT KNEE PROSTHESIS, SUBSEQUENT ENCOUNTER: Status: ACTIVE | Noted: 2018-07-11

## 2024-05-07 PROBLEM — R93.1 CARDIAC LV EJECTION FRACTION 21-30%: Status: ACTIVE | Noted: 2023-06-26

## 2024-05-07 PROBLEM — L97.522: Status: ACTIVE | Noted: 2023-09-26

## 2024-05-07 PROBLEM — R06.3 CHEYNE-STOKES RESPIRATION: Status: ACTIVE | Noted: 2024-02-24

## 2024-05-07 PROBLEM — R26.2 DIFFICULTY IN WALKING, NOT ELSEWHERE CLASSIFIED: Status: ACTIVE | Noted: 2018-07-11

## 2024-05-07 PROBLEM — I25.5 ISCHEMIC CARDIOMYOPATHY: Chronic | Status: ACTIVE | Noted: 2023-11-14

## 2024-05-07 PROBLEM — E78.5 HYPERLIPIDEMIA, UNSPECIFIED: Status: ACTIVE | Noted: 2018-07-11

## 2024-05-07 PROCEDURE — 99214 OFFICE O/P EST MOD 30 MIN: CPT | Performed by: INTERNAL MEDICINE

## 2024-05-07 PROCEDURE — 1159F MED LIST DOCD IN RCRD: CPT | Performed by: INTERNAL MEDICINE

## 2024-05-07 PROCEDURE — 3074F SYST BP LT 130 MM HG: CPT | Performed by: INTERNAL MEDICINE

## 2024-05-07 PROCEDURE — 93926 LOWER EXTREMITY STUDY: CPT | Performed by: SURGERY

## 2024-05-07 PROCEDURE — 1126F AMNT PAIN NOTED NONE PRSNT: CPT | Performed by: INTERNAL MEDICINE

## 2024-05-07 PROCEDURE — 93926 LOWER EXTREMITY STUDY: CPT

## 2024-05-07 PROCEDURE — 3078F DIAST BP <80 MM HG: CPT | Performed by: INTERNAL MEDICINE

## 2024-05-07 RX ORDER — IPRATROPIUM BROMIDE 42 UG/1
2 SPRAY, METERED NASAL 4 TIMES DAILY PRN
COMMUNITY
Start: 2024-04-17

## 2024-05-07 RX ORDER — FERROUS SULFATE 325(65) MG
1 TABLET ORAL
COMMUNITY
Start: 2023-11-15

## 2024-05-07 RX ORDER — ATORVASTATIN CALCIUM 20 MG/1
20 TABLET, FILM COATED ORAL DAILY
COMMUNITY
Start: 2023-09-18

## 2024-05-07 RX ORDER — NAPROXEN SODIUM 220 MG/1
81 TABLET, FILM COATED ORAL DAILY
Qty: 30 TABLET | Refills: 11 | Status: SHIPPED | OUTPATIENT
Start: 2024-05-07 | End: 2025-05-07

## 2024-05-07 RX ORDER — TRIAMCINOLONE ACETONIDE 5 MG/G
1 CREAM TOPICAL 3 TIMES DAILY PRN
COMMUNITY
Start: 2023-05-26

## 2024-05-07 RX ORDER — PANTOPRAZOLE SODIUM 20 MG/1
20 TABLET, DELAYED RELEASE ORAL
COMMUNITY
Start: 2023-09-20

## 2024-05-07 RX ORDER — LOSARTAN POTASSIUM 25 MG/1
50 TABLET ORAL DAILY
COMMUNITY
Start: 2023-05-24

## 2024-05-07 RX ORDER — PREDNISONE 20 MG/1
10 TABLET ORAL DAILY
COMMUNITY

## 2024-05-07 RX ORDER — GABAPENTIN 300 MG/1
CAPSULE ORAL
COMMUNITY
Start: 2023-05-23

## 2024-05-07 RX ORDER — HYDROCODONE BITARTRATE AND ACETAMINOPHEN 5; 325 MG/1; MG/1
1 TABLET ORAL EVERY 8 HOURS PRN
COMMUNITY
Start: 2023-05-26

## 2024-05-07 RX ORDER — CLOTRIMAZOLE AND BETAMETHASONE DIPROPIONATE 10; .64 MG/G; MG/G
1 CREAM TOPICAL 2 TIMES DAILY
COMMUNITY
Start: 2023-10-02

## 2024-05-07 RX ORDER — AMOXICILLIN 500 MG/1
2000 CAPSULE ORAL
COMMUNITY
Start: 2024-04-15

## 2024-05-07 RX ORDER — MECLIZINE HYDROCHLORIDE 25 MG/1
TABLET ORAL
COMMUNITY
Start: 2023-09-25

## 2024-05-07 ASSESSMENT — PAIN SCALES - GENERAL: PAINLEVEL: 0-NO PAIN

## 2024-05-07 NOTE — PATIENT INSTRUCTIONS
Mr. Jones, it was great seeing you in clinic today. We reviewed your imaging and your foot looks great. Please continue your foot care as you are doing. Stop taking Plavix (Clopidogrel) and start taking baby Aspirin daily. Continue to take Eliquis given your history of blood clots. We will see you back in 1 year for repeat testing.

## 2024-05-14 ENCOUNTER — APPOINTMENT (OUTPATIENT)
Dept: VASCULAR MEDICINE | Facility: HOSPITAL | Age: 79
End: 2024-05-14
Payer: COMMERCIAL

## 2024-05-14 ENCOUNTER — APPOINTMENT (OUTPATIENT)
Dept: RADIOLOGY | Facility: HOSPITAL | Age: 79
End: 2024-05-14
Payer: COMMERCIAL

## 2025-05-02 NOTE — PROGRESS NOTES
PAD follow up annual visit  Self-referred  for left lower extremity limb salvage in July 2023    History of Present Illness  Cedric Jones is a 79 y.o. year old male from [place], [place] with PMH GERD, polymyalgia rheumatica on chronic steroids, CAD s/p PCI (last one in June 2022, unclear vessel), HTN, HLD, CHRIS, BPH, DVT, bilateral knee and hip replacements, ischemic cardiomyopathy (EF15 -20% 7/2023), LV thrombus and severe PAD, who developed bilateral ulcers in both feet after biking. 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 Pt was self referred to us for left lower extremity limb salvage in July 2023.    On 7/17/23, pt underwent PTA-DCB of Lt SFA-popliteal and PTA-stenting to left TPT/PT by Dr. Duarte for LLE CLTI 6.     Interval Medical History  July 2023: Post-procedure 7/18 NICO/TBI 0.40/0, the waveform showed improved flow except in the digits  August 2023: s/p L TMA by local Podiatrist, Dr. Nunez  May 2024: Virtual visit, healed TMA, no LLE pain, noted intraocular bleeding by Optometrist and thus Plavix discontinued, Aspirin 81mg once daily commenced  May 2025: Annual visit, new, non-healing wound on medial anterior aspect of L TMA site, present since December, not consistently following with Podiatry or wound care, no pain.     Past Medical History  Medical History[1]    Past Surgical History  Surgical History[2]    Social History  Social History[3]    Family History   Family History[4]    Review of Systems  As per HPI, all other systems reviewed and negative.    Allergies:  RX Allergies[5]     Outpatient Medications:  Current Outpatient Medications   Medication Instructions    acetaminophen (TYLENOL 8 HOUR) 650 mg, Every 8 hours PRN    albuterol 90 mcg/actuation inhaler 2 puffs, Every 6 hours PRN    amoxicillin (AMOXIL) 2,000 mg    apixaban (Eliquis) 5 mg tablet TAKE 1 TABLET BY MOUTH EVERY 12 HOURS     aspirin 81 mg, oral, Daily    atorvastatin (LIPITOR) 20 mg, Daily    brimonidine (AlphaGAN) 0.2 % ophthalmic solution 1 drop, 2 times daily    clotrimazole-betamethasone (Lotrisone) cream 1 Application, 2 times daily    cyanocobalamin (Vitamin B-12) 1,000 mcg tablet oral, Daily RT    dorzolamide-timoloL (Cosopt) 22.3-6.8 mg/mL ophthalmic solution 1 drop, 2 times daily    esomeprazole (NEXIUM) 20 mg, Daily before breakfast    ferrous sulfate, 325 mg ferrous sulfate, tablet 1 tablet, Daily with breakfast    finasteride (PROSCAR) 5 mg, Daily    fluticasone (Flonase) 50 mcg/actuation nasal spray 1 spray, Daily    gabapentin (Neurontin) 300 mg capsule PLEASE SEE ATTACHED FOR DETAILED DIRECTIONS    HYDROcodone-acetaminophen (Norco) 5-325 mg tablet 1 tablet, Every 8 hours PRN    ipratropium (Atrovent) 42 mcg (0.06 %) nasal spray 2 sprays, 4 times daily PRN    levothyroxine (SYNTHROID, LEVOXYL) 75 mcg, Daily    losartan (COZAAR) 50 mg, Daily    meclizine (Antivert) 25 mg tablet     methylPREDNISolone (MEDROL) 6 mg, Daily RT    metoprolol succinate XL (Toprol-XL) 25 mg 24 hr tablet TAKE 1/2 TABLET BY MOUTH ONCE DAILY    nitroglycerin (NITROSTAT) 0.4 mg, Every 4 hours PRN    pantoprazole (PROTONIX) 20 mg, Daily before breakfast    polyethylene glycol (GLYCOLAX, MIRALAX) 17 g, Daily PRN    predniSONE (DELTASONE) 10 mg, Daily    rosuvastatin (CRESTOR) 10 mg, Daily    sacubitriL-valsartan (Entresto) 24-26 mg tablet 0.5 tablets, 2 times daily    spironolactone (ALDACTONE) 12.5 mg, Daily RT    tamsulosin (FLOMAX) 0.4 mg, Daily    triamcinolone (Kenalog) 0.5 % cream 1 Application, 3 times daily PRN    vit A/vit C/vit E/zinc/copper (EYE MULTIVITAMIN ORAL) 1 tablet, Daily         Vitals:  Vitals:    05/06/25 1410   BP: 116/63   Pulse: 75       Physical Exam:  Physical Exam  Constitutional:       Appearance: Normal appearance.   HENT:      Head: Normocephalic and atraumatic.      Mouth/Throat:      Mouth: Mucous membranes are moist.       Pharynx: Oropharynx is clear.   Eyes:      Extraocular Movements: Extraocular movements intact.      Conjunctiva/sclera: Conjunctivae normal.      Pupils: Pupils are equal, round, and reactive to light.   Cardiovascular:      Rate and Rhythm: Normal rate and regular rhythm.      Heart sounds: Normal heart sounds.   Pulmonary:      Breath sounds: Normal breath sounds.   Abdominal:      General: Abdomen is flat. Bowel sounds are normal.      Palpations: Abdomen is soft.      Tenderness: There is no abdominal tenderness.   Musculoskeletal:      Cervical back: Normal range of motion and neck supple.      Left lower leg: Edema present.      Comments: DP/PT pulse L  S/p R BKA  See wound photos   Skin:     Capillary Refill: Capillary refill takes 2 to 3 seconds.   Neurological:      General: No focal deficit present.      Mental Status: He is alert and oriented to person, place, and time. Mental status is at baseline.   Psychiatric:         Mood and Affect: Mood normal.         Behavior: Behavior normal.         Thought Content: Thought content normal.         Judgment: Judgment normal.           Reviewed Study(s):    Cardiac Studies  Echo: No results found for this or any previous visit.  Angiogram:     Vascular Studies   Vascular US lower extremity arterial duplex left  Preliminary Cardiology Report              [place] [address]    Tel [phone] and Fax [phone]             Preliminary Vascular Lab Report     VASC US LOWER EXTREMITY ARTERIAL DUPLEX LEFT       Patient Name:      ALLAN Drake Physician: [id number] Maddie QUINONES MD  Study Date:        5/6/2025        Ordering           [id number] BRIANNA TELLEZ                                     Physician:  MRN/PID:           [id number]        Technologist:      Maria Del Rosario Joshua T  Accession#:        [id number]    Technologist 2:    Alma Rosa  Bj MAGALLANES, [facility]  Date of Birth/Age: 1945       Encounter#:        [id number]  Gender:            M  Admission Status:  Outpatient      Location           [facility]                                     Performed:       Diagnosis/ICD: Peripheral vascular disease, unspecified-I73.9  Procedure/CPT: 27524 Peripheral artery Lower arterial Duplex limited       Patient History: Right BKA and amputated left digits.       PRELIMINARY CONCLUSIONS:  Left Lower Arterial: Left LimFlow Table:  Inflow artery VF 41 ml/min Diam 4.5 mm PSV 10 cm/s  Crossing stent/anast VF 59 ml/min Diam 3.8 mm PSV 21 cm/s  Mid stent -VF 32 ml/min Diam 3.2 mm PSV 32 cm/s  Distal stent graft VF 42 ml/min Diam 3.0 mm PSV 39 cm/s  Outflow arterialized vein VF 14 ml/min Diam 2.1 mm PSV 31 cm/s    There is a branch noted at the distal end of the graft with a velocity of 25 cm/s.  Tecknically difficult study due to leg swelling and irritated/inflamed skin.     Additional Findings:  There are chronic changes visualized in the popliteal vein.       Imaging & Doppler Findings:     Left      Compress Thrombus  Flow  Popliteal Partial  Chronic  Reflux    Right                     Left   PSV                      PSV               CFA        40 cm/s        Profunda Proximal 31 cm/s          SFA Proximal    32 cm/s             SFA Mid      45 cm/s           SFA Distal     138 cm/s            Popliteal      9 cm/s           RASHEEDA Distal      0 cm/s         Peroneal Distal   0 cm/s           PTA Distal     37 cm/s         VASCULAR PRELIMINARY REPORT  completed by Maria Del Rosario Joshua RVT on 5/6/2025 at 12:50:19 PM       ** Final **       Assessment/Plan   Cedric Jones is a 79 y.o. year old male from [place], [place] with PMH GERD, polymyalgia rheumatica on chronic steroids, CAD s/p PCI (last one in June 2022, unclear vessel), HTN, HLD, CHRIS, BPH, DVT, bilateral knee and hip replacements, ischemic cardiomyopathy (EF15 -20% 7/2023), LV thrombus and severe  PAD, who developed bilateral ulcers in both feet after biking. 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 Pt was self referred to us for left lower extremity limb salvage in July 2023.    On 7/17/23, pt underwent PTA-DCB of Lt SFA-popliteal and PTA-stenting to left TPT/PT by Dr. Duarte for LLE CLTI 6.     PLAN:  - We reviewed the wound and proper care at length, will follow-up with local Podiatrist and wound care ASAP   Has not seen Podiatry/wound care since February    Advised to follow Podiatry/wound recommendations diligently and if wound worsens they will call our office and we can consider repeat angiogram  - MUST elevate L foot above level of heart to facilitate edema management and minimize tissue swelling, we discussed this at length  - We discussed proper foot care and precautions including minimizing friction and irritation to the site  - High risk for bleeding but remiss to remove Eliquis in setting of prior LV thrombus. Continue Aspirin 81mg once daily  - Continue current medications  - Return to clinic in 1 year with testing unless indicated above    Problem List Items Addressed This Visit           ICD-10-CM    PAD (peripheral artery disease) (CMS-[facility]) - Primary I73.9    Relevant Orders    Vascular US ankle brachial index (NICO) without exercise    Vascular US lower extremity arterial duplex left     Other Visit Diagnoses         Codes      Hx of right BKA (Multi)     Z89.511      Edema of left lower extremity     R60.0      Long-term use of aspirin therapy     Z79.82      Adverse effect of clopidogrel     T45.525A      At high risk for bleeding     Z91.89      H/O angiography     Z92.89      Hypertension     I10      Non-healing non-surgical wound     T14.8XXA      Encounter for education     Z71.9          Carol Rahman, APRN-CNP  I personally reviewed all above lab work, imaging results, and pertinent  medical history prior to and/or during this visit.     Shared visit with Dr. Duarte         [1]   Past Medical History:  Diagnosis Date    Atrial fibrillation (Multi)     CHF (congestive heart failure)     Congenital heart disease     Critical limb ischemia of left lower extremity     DVT (deep venous thrombosis) (Multi)     Hyperlipidemia     Hypertension     Ischemic dilated cardiomyopathy due to coronary artery disease     PAD (peripheral artery disease) (CMS-[facility])     Polymyalgia rheumatica (Multi)     on chronic steroids    Right atrial thrombus     S/P peripheral artery angioplasty with stent placement 07/17/2023    PTA-DCB to left SFA-popliteal and PTA-stenting to left TPT/PT    Sleep apnea    [2]   Past Surgical History:  Procedure Laterality Date    BALLOON ANGIOPLASTY, ARTERY Left     CAROTID STENT      June 2022    CT ABDOMEN PELVIS ANGIOGRAM W AND/OR WO IV CONTRAST  09/19/2020    CT ABDOMEN PELVIS ANGIOGRAM W AND/OR WO IV CONTRAST 9/19/2020    FOOT AMPUTATION THROUGH METATARSAL Left 08/11/2023    By Dr. Enrique [facility]z at [facility]    LEG AMPUTATION THROUGH LOWER TIBIA AND FIBULA Right 06/13/2023    TOTAL HIP ARTHROPLASTY Bilateral     TOTAL KNEE ARTHROPLASTY Bilateral    [3]   Social History  Tobacco Use    Smoking status: Never     Passive exposure: Never    Smokeless tobacco: Never   Substance Use Topics    Alcohol use: Yes     Alcohol/week: 2.0 standard drinks of alcohol     Types: 2 Glasses of wine per week     Comment: occ    Drug use: Never   [4]   Family History  Family history unknown: Yes   [5]   Allergies  Allergen Reactions    Adhesive Unknown     Tears skin off    Aztreonam Unknown     Put on this after Vancomycin reaction; symptoms then got worse, but not sure what was making it worse    Cefepime Hives     Rash/hives; on at same time as Vancomycin      3/4/22 negative cefazolin test    Enoxaparin Hives    Heparin Other     Historical Data review with patient    Vancomycin  Analogues Hives and Swelling     Rash, hives    Oxycodone-Acetaminophen Confusion

## 2025-05-06 ENCOUNTER — HOSPITAL ENCOUNTER (OUTPATIENT)
Dept: VASCULAR MEDICINE | Facility: HOSPITAL | Age: 80
Discharge: HOME | End: 2025-05-06
Payer: MEDICARE

## 2025-05-06 ENCOUNTER — APPOINTMENT (OUTPATIENT)
Dept: VASCULAR MEDICINE | Facility: HOSPITAL | Age: 80
End: 2025-05-06
Payer: COMMERCIAL

## 2025-05-06 ENCOUNTER — APPOINTMENT (OUTPATIENT)
Dept: CARDIOLOGY | Facility: CLINIC | Age: 80
End: 2025-05-06
Payer: MEDICARE

## 2025-05-06 VITALS
HEIGHT: 73 IN | DIASTOLIC BLOOD PRESSURE: 63 MMHG | BODY MASS INDEX: 25.18 KG/M2 | SYSTOLIC BLOOD PRESSURE: 116 MMHG | HEART RATE: 75 BPM | WEIGHT: 190 LBS

## 2025-05-06 DIAGNOSIS — Z91.89 AT HIGH RISK FOR BLEEDING: ICD-10-CM

## 2025-05-06 DIAGNOSIS — R60.0 EDEMA OF LEFT LOWER EXTREMITY: ICD-10-CM

## 2025-05-06 DIAGNOSIS — I73.9 PAD (PERIPHERAL ARTERY DISEASE): Primary | ICD-10-CM

## 2025-05-06 DIAGNOSIS — Z89.511 HX OF RIGHT BKA (MULTI): ICD-10-CM

## 2025-05-06 DIAGNOSIS — Z71.9 ENCOUNTER FOR EDUCATION: ICD-10-CM

## 2025-05-06 DIAGNOSIS — I10 HYPERTENSION: ICD-10-CM

## 2025-05-06 DIAGNOSIS — I73.9 PAD (PERIPHERAL ARTERY DISEASE) (CMS-HCC): ICD-10-CM

## 2025-05-06 DIAGNOSIS — Z79.82 LONG-TERM USE OF ASPIRIN THERAPY: ICD-10-CM

## 2025-05-06 DIAGNOSIS — T45.525A: ICD-10-CM

## 2025-05-06 DIAGNOSIS — Z92.89 H/O ANGIOGRAPHY: ICD-10-CM

## 2025-05-06 DIAGNOSIS — T14.8XXA NON-HEALING NON-SURGICAL WOUND: ICD-10-CM

## 2025-05-06 PROCEDURE — 93926 LOWER EXTREMITY STUDY: CPT

## 2025-05-06 PROCEDURE — 93926 LOWER EXTREMITY STUDY: CPT | Performed by: SURGERY

## 2025-05-06 RX ORDER — METHYLPREDNISOLONE 4 MG/1
6 TABLET ORAL
COMMUNITY
Start: 2023-08-08 | End: 2025-10-25

## 2025-05-06 RX ORDER — SPIRONOLACTONE 25 MG/1
12.5 TABLET ORAL
COMMUNITY
Start: 2024-10-31 | End: 2025-10-31

## 2025-05-06 RX ORDER — NITROGLYCERIN 0.4 MG/1
0.4 TABLET SUBLINGUAL EVERY 4 HOURS PRN
COMMUNITY
Start: 2024-10-25

## 2025-05-06 RX ORDER — DEXTROMETHORPHAN HYDROBROMIDE, GUAIFENESIN 5; 100 MG/5ML; MG/5ML
650 LIQUID ORAL EVERY 8 HOURS PRN
COMMUNITY

## 2025-05-06 RX ORDER — LANOLIN ALCOHOL/MO/W.PET/CERES
CREAM (GRAM) TOPICAL
COMMUNITY

## 2025-05-06 ASSESSMENT — PATIENT HEALTH QUESTIONNAIRE - PHQ9
2. FEELING DOWN, DEPRESSED OR HOPELESS: NOT AT ALL
1. LITTLE INTEREST OR PLEASURE IN DOING THINGS: NOT AT ALL
SUM OF ALL RESPONSES TO PHQ9 QUESTIONS 1 AND 2: 0

## 2025-05-06 ASSESSMENT — ENCOUNTER SYMPTOMS
OCCASIONAL FEELINGS OF UNSTEADINESS: 1
DEPRESSION: 0
LOSS OF SENSATION IN FEET: 0

## 2025-05-06 ASSESSMENT — PAIN SCALES - GENERAL: PAINLEVEL_OUTOF10: 4

## 2025-05-06 NOTE — PATIENT INSTRUCTIONS
It was a pleasure taking care of you today and appreciate your seeing us at our [facility].     Today we discussed:  - You must follow up with local Podiatry and Wound Care to heal the small wound on your foot and prevent the friction from worsening on the side of the foot  - Continue to monitor the wound progress, please call the office if your wound worsens or gets larger as you may need another angiogram  -  Continue to elevate your left foot above the level of your heart to facilitate wound healing, this includes propped up on several pillows when laying or further elevated when you are sitting in a chair/recliner    Today's plan is as follows:  - Return to clinic in 1 year with repeat testing  - Call the office if your wound worsens as you may need a repeat angiogram to restore blood flow  - No medication changes at this time, continue your current prescribed medications     Please call the office with any questions at [phone], press option 1  If you need coordinating your appointments and testing you can do these at the  or by calling my office shortly after your visit.

## 2025-05-12 ENCOUNTER — APPOINTMENT (OUTPATIENT)
Dept: CARDIOLOGY | Facility: CLINIC | Age: 80
End: 2025-05-12
Payer: MEDICARE

## 2026-05-12 ENCOUNTER — APPOINTMENT (OUTPATIENT)
Dept: CARDIOLOGY | Facility: CLINIC | Age: 81
End: 2026-05-12
Payer: MEDICARE